# Patient Record
Sex: MALE | Race: WHITE | NOT HISPANIC OR LATINO | Employment: FULL TIME | URBAN - METROPOLITAN AREA
[De-identification: names, ages, dates, MRNs, and addresses within clinical notes are randomized per-mention and may not be internally consistent; named-entity substitution may affect disease eponyms.]

---

## 2023-03-01 LAB — SARS-COV-2 RESULT: NOT DETECTED

## 2023-05-25 DIAGNOSIS — I10 ESSENTIAL HYPERTENSION: ICD-10-CM

## 2023-05-25 DIAGNOSIS — K21.9 GASTROESOPHAGEAL REFLUX DISEASE WITHOUT ESOPHAGITIS: ICD-10-CM

## 2023-05-25 RX ORDER — PANTOPRAZOLE SODIUM 40 MG/1
40 TABLET, DELAYED RELEASE ORAL DAILY
Qty: 90 TABLET | Refills: 0 | Status: SHIPPED | OUTPATIENT
Start: 2023-05-25 | End: 2023-06-28 | Stop reason: SDUPTHER

## 2023-05-25 RX ORDER — PANTOPRAZOLE SODIUM 40 MG/1
1 TABLET, DELAYED RELEASE ORAL DAILY
COMMUNITY
Start: 2022-09-02 | End: 2023-05-25 | Stop reason: SDUPTHER

## 2023-05-25 RX ORDER — AMLODIPINE AND BENAZEPRIL HYDROCHLORIDE 10; 40 MG/1; MG/1
1 CAPSULE ORAL DAILY
COMMUNITY
Start: 2022-04-06 | End: 2023-05-25 | Stop reason: SDUPTHER

## 2023-05-25 RX ORDER — AMLODIPINE AND BENAZEPRIL HYDROCHLORIDE 10; 40 MG/1; MG/1
1 CAPSULE ORAL DAILY
Qty: 90 CAPSULE | Refills: 0 | Status: SHIPPED | OUTPATIENT
Start: 2023-05-25 | End: 2023-06-01 | Stop reason: SINTOL

## 2023-06-01 ENCOUNTER — OFFICE VISIT (OUTPATIENT)
Dept: PRIMARY CARE | Facility: CLINIC | Age: 43
End: 2023-06-01
Payer: COMMERCIAL

## 2023-06-01 VITALS
DIASTOLIC BLOOD PRESSURE: 77 MMHG | BODY MASS INDEX: 26.71 KG/M2 | WEIGHT: 208 LBS | HEART RATE: 79 BPM | SYSTOLIC BLOOD PRESSURE: 128 MMHG

## 2023-06-01 DIAGNOSIS — I10 ESSENTIAL HYPERTENSION: Primary | ICD-10-CM

## 2023-06-01 PROBLEM — F19.90 PSYCHOACTIVE SUBSTANCE USE DISORDER: Status: ACTIVE | Noted: 2023-06-01

## 2023-06-01 PROBLEM — N52.9 ERECTILE DYSFUNCTION: Status: ACTIVE | Noted: 2023-06-01

## 2023-06-01 PROBLEM — G25.2 INTENTION TREMOR: Status: ACTIVE | Noted: 2023-06-01

## 2023-06-01 PROBLEM — J30.2 SEASONAL ALLERGIES: Status: ACTIVE | Noted: 2023-06-01

## 2023-06-01 PROBLEM — M51.36 BULGING LUMBAR DISC: Status: ACTIVE | Noted: 2023-06-01

## 2023-06-01 PROBLEM — M51.369 BULGING LUMBAR DISC: Status: ACTIVE | Noted: 2023-06-01

## 2023-06-01 PROBLEM — F41.9 ANXIETY: Status: ACTIVE | Noted: 2023-06-01

## 2023-06-01 PROBLEM — R74.01 ELEVATED ALANINE AMINOTRANSFERASE (ALT) LEVEL: Status: ACTIVE | Noted: 2023-06-01

## 2023-06-01 PROBLEM — F32.A DEPRESSION: Status: ACTIVE | Noted: 2023-06-01

## 2023-06-01 PROBLEM — N28.1 RENAL CYST, RIGHT: Status: ACTIVE | Noted: 2023-06-01

## 2023-06-01 PROBLEM — J45.901 ASTHMA EXACERBATION, MILD (HHS-HCC): Status: ACTIVE | Noted: 2023-06-01

## 2023-06-01 PROBLEM — G47.09 OTHER INSOMNIA: Status: ACTIVE | Noted: 2023-06-01

## 2023-06-01 PROCEDURE — 3074F SYST BP LT 130 MM HG: CPT | Performed by: NURSE PRACTITIONER

## 2023-06-01 PROCEDURE — 3078F DIAST BP <80 MM HG: CPT | Performed by: NURSE PRACTITIONER

## 2023-06-01 PROCEDURE — 99213 OFFICE O/P EST LOW 20 MIN: CPT | Performed by: NURSE PRACTITIONER

## 2023-06-01 PROCEDURE — 1036F TOBACCO NON-USER: CPT | Performed by: NURSE PRACTITIONER

## 2023-06-01 RX ORDER — ALBUTEROL SULFATE 90 UG/1
2 AEROSOL, METERED RESPIRATORY (INHALATION) EVERY 6 HOURS PRN
COMMUNITY
Start: 2018-12-26 | End: 2023-06-28 | Stop reason: SDUPTHER

## 2023-06-01 RX ORDER — PROPRANOLOL HYDROCHLORIDE 20 MG/1
20 TABLET ORAL 3 TIMES DAILY PRN
COMMUNITY
End: 2023-06-28 | Stop reason: SDUPTHER

## 2023-06-01 RX ORDER — BUPROPION HYDROCHLORIDE 300 MG/1
300 TABLET ORAL
COMMUNITY
End: 2024-02-15 | Stop reason: WASHOUT

## 2023-06-01 RX ORDER — CHLORTHALIDONE 25 MG/1
12.5 TABLET ORAL DAILY
Qty: 15 TABLET | Refills: 0 | Status: SHIPPED | OUTPATIENT
Start: 2023-06-01 | End: 2023-06-28 | Stop reason: SINTOL

## 2023-06-01 RX ORDER — BENAZEPRIL HYDROCHLORIDE 40 MG/1
40 TABLET ORAL DAILY
Qty: 30 TABLET | Refills: 11 | Status: SHIPPED | OUTPATIENT
Start: 2023-06-01 | End: 2023-06-28 | Stop reason: SINTOL

## 2023-06-01 RX ORDER — TADALAFIL 20 MG/1
TABLET ORAL
COMMUNITY
Start: 2022-04-06 | End: 2023-06-20 | Stop reason: SDUPTHER

## 2023-06-01 RX ORDER — CHLORTHALIDONE 25 MG/1
25 TABLET ORAL DAILY
Qty: 30 TABLET | Refills: 0 | Status: SHIPPED | OUTPATIENT
Start: 2023-06-01 | End: 2023-06-01 | Stop reason: DRUGHIGH

## 2023-06-01 RX ORDER — ESCITALOPRAM OXALATE 5 MG/1
TABLET ORAL
COMMUNITY
End: 2024-02-15 | Stop reason: WASHOUT

## 2023-06-01 NOTE — PATIENT INSTRUCTIONS
Thank you for seeing me today.  It was a pleasure to see you again!    #HTN  Your blood pressure should be </= 130/80.  Today your blood pressure was 128/77  STOP Benazepril-Amlodipine   BEGIN BENAZEPRIL 40 MG AND CHLORTHALIDONE 12.5 MG DAILY    You should avoid foods that are high in sodium and saturated fats  Exercise daily for at least 30 minutes  minutes/week  Avoid stressful situations as this can increase your blood pressure  Take your medications as prescribed--do not skip doses  Obtain a BP monitor and check your blood pressure at home. Check it around the same time each day, at least 1 hour after taking your medication.  Record your blood pressure in a log and  bring your log with you to your next appointment.    RTC 3 WEEKS FOR BP CHECK

## 2023-06-01 NOTE — PROGRESS NOTES
"Subjective   Chief Complaint   Patient presents with    Follow-up     YANIRA IS HERE TODAY FOR ROUTINE F/U AND MEDICATION REFILLS.        Patient ID: Yanira Hernandez is a 42 y.o. male who presents for Follow-up (YANIRA IS HERE TODAY FOR ROUTINE F/U AND MEDICATION REFILLS. ).    HPI  Est 41 yo male presents today for 6 mo f/u on HTN    #HTN  Rx Amlodipine-Benazepril 10-40 mg daily  BP rhicr=023/77  Pt c/o \"excessive sweating\" and feels it may be r/t the BP med, would like to try changing it     Pt seeing Bria Vasquez for anxiety/depression  LOV Jan 2023    Review of Systems  All 13 systems were reviewed and are within normal limits except positive and pertinent negative responses which are noted below or in HPI.     Objective   /77   Pulse 79   Wt 94.3 kg (208 lb)   BMI 26.71 kg/m²        Physical Exam  Vitals reviewed.   Cardiovascular:      Pulses: Normal pulses.      Heart sounds: No murmur heard.  Pulmonary:      Effort: Pulmonary effort is normal.   Neurological:      Mental Status: He is alert.         Assessment/Plan   Problem List Items Addressed This Visit          Circulatory    Essential hypertension - Primary    Relevant Medications    benazepril (Lotensin) 40 mg tablet    chlorthalidone (Hygroton) 25 mg tablet       "

## 2023-06-20 ENCOUNTER — APPOINTMENT (OUTPATIENT)
Dept: PRIMARY CARE | Facility: CLINIC | Age: 43
End: 2023-06-20
Payer: COMMERCIAL

## 2023-06-20 DIAGNOSIS — N52.39 POST-PROCEDURAL ERECTILE DYSFUNCTION, UNSPECIFIED TYPE: Primary | ICD-10-CM

## 2023-06-20 RX ORDER — TADALAFIL 20 MG/1
20 TABLET ORAL DAILY PRN
Qty: 10 TABLET | Refills: 0 | Status: SHIPPED | OUTPATIENT
Start: 2023-06-20 | End: 2023-06-28 | Stop reason: SDUPTHER

## 2023-06-27 NOTE — PROGRESS NOTES
"Subjective   Chief Complaint   Patient presents with    Follow-up     Jose Alejandro is here today for 3-4 week F/U.        Patient ID: Jose Alejandro Hernandez is a 43 y.o. male who presents for Follow-up (Jose Alejandro is here today for 3-4 week F/U. ).    HPI  Est 44 yo male presents for 1 mo f/u on medication change    Pt requested to change his BP med from Benazepril-Amlodipine to Benazepril 40 mg and Chlorthalidone 12.5 mg daily    Pt reports today that he had began feeling dizzy/lightheaded and fatigued a few days after beginning medication and then passed out x 1 occurrence and states his wife took his BP and it was \"70/50\"  Pt did not sustain any injuries or seek medical care (or call my office)  Pt states he stopped taking all BP meds x 2.5 weeks ago and has been fine    BP today= 113/73       Review of Systems  All 13 systems were reviewed and are within normal limits except positive and pertinent negative responses which are noted below or in HPI.    Objective   /73   Pulse 73   Ht 1.88 m (6' 2\")   Wt 94.3 kg (208 lb)   BMI 26.71 kg/m²        Physical Exam  Cardiovascular:      Pulses: Normal pulses.      Heart sounds: No murmur heard.  Pulmonary:      Effort: Pulmonary effort is normal.   Neurological:      Mental Status: He is alert.         Assessment/Plan   Problem List Items Addressed This Visit       Essential hypertension    GERD (gastroesophageal reflux disease)    Relevant Medications    pantoprazole (ProtoNix) 40 mg EC tablet    Asthma exacerbation, mild    Relevant Medications    albuterol 90 mcg/actuation inhaler    Anxiety - Primary    Relevant Medications    propranolol (Inderal) 20 mg tablet    Erectile dysfunction    Relevant Medications    tadalafil (Cialis) 20 mg tablet    Cannabis use disorder, severe, in early remission (CMS/[facility])       "

## 2023-06-28 ENCOUNTER — OFFICE VISIT (OUTPATIENT)
Dept: PRIMARY CARE | Facility: CLINIC | Age: 43
End: 2023-06-28
Payer: COMMERCIAL

## 2023-06-28 VITALS
DIASTOLIC BLOOD PRESSURE: 73 MMHG | SYSTOLIC BLOOD PRESSURE: 113 MMHG | HEART RATE: 73 BPM | WEIGHT: 208 LBS | HEIGHT: 74 IN | BODY MASS INDEX: 26.69 KG/M2

## 2023-06-28 DIAGNOSIS — K21.9 GASTROESOPHAGEAL REFLUX DISEASE WITHOUT ESOPHAGITIS: ICD-10-CM

## 2023-06-28 DIAGNOSIS — N52.39 POST-PROCEDURAL ERECTILE DYSFUNCTION, UNSPECIFIED TYPE: ICD-10-CM

## 2023-06-28 DIAGNOSIS — F12.21: ICD-10-CM

## 2023-06-28 DIAGNOSIS — J45.901 ASTHMA EXACERBATION, MILD (HHS-HCC): ICD-10-CM

## 2023-06-28 DIAGNOSIS — I10 ESSENTIAL HYPERTENSION: ICD-10-CM

## 2023-06-28 DIAGNOSIS — N52.2 DRUG-INDUCED ERECTILE DYSFUNCTION: ICD-10-CM

## 2023-06-28 DIAGNOSIS — F41.9 ANXIETY: Primary | ICD-10-CM

## 2023-06-28 PROCEDURE — 1036F TOBACCO NON-USER: CPT | Performed by: NURSE PRACTITIONER

## 2023-06-28 PROCEDURE — 3074F SYST BP LT 130 MM HG: CPT | Performed by: NURSE PRACTITIONER

## 2023-06-28 PROCEDURE — 99213 OFFICE O/P EST LOW 20 MIN: CPT | Performed by: NURSE PRACTITIONER

## 2023-06-28 PROCEDURE — 3078F DIAST BP <80 MM HG: CPT | Performed by: NURSE PRACTITIONER

## 2023-06-28 RX ORDER — PANTOPRAZOLE SODIUM 40 MG/1
40 TABLET, DELAYED RELEASE ORAL DAILY
Qty: 90 TABLET | Refills: 0 | Status: SHIPPED | OUTPATIENT
Start: 2023-06-28 | End: 2023-10-03 | Stop reason: SDUPTHER

## 2023-06-28 RX ORDER — PROPRANOLOL HYDROCHLORIDE 20 MG/1
20 TABLET ORAL 3 TIMES DAILY PRN
Qty: 30 TABLET | Refills: 0 | Status: SHIPPED | OUTPATIENT
Start: 2023-06-28 | End: 2024-02-15 | Stop reason: WASHOUT

## 2023-06-28 RX ORDER — SERTRALINE HYDROCHLORIDE 100 MG/1
100 TABLET, FILM COATED ORAL DAILY
COMMUNITY
Start: 2023-06-04 | End: 2024-02-15 | Stop reason: WASHOUT

## 2023-06-28 RX ORDER — ALBUTEROL SULFATE 90 UG/1
2 AEROSOL, METERED RESPIRATORY (INHALATION) EVERY 6 HOURS PRN
Qty: 18 G | Refills: 3 | Status: SHIPPED | OUTPATIENT
Start: 2023-06-28

## 2023-06-28 RX ORDER — TADALAFIL 20 MG/1
20 TABLET ORAL DAILY PRN
Qty: 30 TABLET | Refills: 1 | Status: SHIPPED | OUTPATIENT
Start: 2023-06-28 | End: 2023-08-27

## 2023-06-28 NOTE — PATIENT INSTRUCTIONS
Thank you for seeing me today.  It was a pleasure to see you again!    Your blood pressure should be </= 130/80.  Today your blood pressure was 113/73  REMAIN OFF OF BP MEDICATIONS     You should avoid foods that are high in sodium and saturated fats  Exercise daily for at least 30 minutes  minutes/week  Avoid stressful situations as this can increase your blood pressure    Obtain a BP monitor and check your blood pressure at home. Check it around the same time each day, at least 1 hour after taking your medication.  Record your blood pressure in a log and  bring your log with you to your next appointment.    RTC AS NEEDED

## 2023-10-03 DIAGNOSIS — K21.9 GASTROESOPHAGEAL REFLUX DISEASE WITHOUT ESOPHAGITIS: ICD-10-CM

## 2023-10-03 RX ORDER — PANTOPRAZOLE SODIUM 40 MG/1
40 TABLET, DELAYED RELEASE ORAL DAILY
Qty: 90 TABLET | Refills: 3 | Status: SHIPPED | OUTPATIENT
Start: 2023-10-03 | End: 2024-09-27

## 2024-02-15 ENCOUNTER — TELEMEDICINE (OUTPATIENT)
Dept: BEHAVIORAL HEALTH | Facility: CLINIC | Age: 44
End: 2024-02-15
Payer: COMMERCIAL

## 2024-02-15 DIAGNOSIS — Z79.899 MEDICATION MANAGEMENT: ICD-10-CM

## 2024-02-15 DIAGNOSIS — J45.901 ASTHMA EXACERBATION, MILD (HHS-HCC): ICD-10-CM

## 2024-02-15 DIAGNOSIS — F33.1 MODERATE EPISODE OF RECURRENT MAJOR DEPRESSIVE DISORDER (MULTI): ICD-10-CM

## 2024-02-15 DIAGNOSIS — F41.1 GAD (GENERALIZED ANXIETY DISORDER): Primary | ICD-10-CM

## 2024-02-15 DIAGNOSIS — G47.00 INSOMNIA, UNSPECIFIED TYPE: ICD-10-CM

## 2024-02-15 DIAGNOSIS — F12.21: ICD-10-CM

## 2024-02-15 DIAGNOSIS — F16.91 HALLUCINOGEN USE DISORDER IN REMISSION: ICD-10-CM

## 2024-02-15 PROCEDURE — 1036F TOBACCO NON-USER: CPT | Performed by: NURSE PRACTITIONER

## 2024-02-15 PROCEDURE — 99214 OFFICE O/P EST MOD 30 MIN: CPT | Performed by: NURSE PRACTITIONER

## 2024-02-15 RX ORDER — BUPROPION HYDROCHLORIDE 300 MG/1
300 TABLET ORAL EVERY MORNING
Qty: 90 TABLET | Refills: 3 | Status: SHIPPED | OUTPATIENT
Start: 2024-02-15 | End: 2025-02-14

## 2024-02-15 RX ORDER — SERTRALINE HYDROCHLORIDE 100 MG/1
100 TABLET, FILM COATED ORAL DAILY
Qty: 90 TABLET | Refills: 3 | Status: SHIPPED | OUTPATIENT
Start: 2024-02-15 | End: 2025-02-14

## 2024-02-15 RX ORDER — PROPRANOLOL HYDROCHLORIDE 10 MG/1
20 TABLET ORAL 3 TIMES DAILY PRN
Qty: 90 TABLET | Refills: 11 | Status: SHIPPED | OUTPATIENT
Start: 2024-02-15 | End: 2025-02-14

## 2024-02-15 ASSESSMENT — ENCOUNTER SYMPTOMS: CONSTITUTIONAL NEGATIVE: 1

## 2024-02-15 NOTE — PROGRESS NOTES
"HPI  Jose Alejandro Hernandez is a 43 y.o. male patient with a chief complaint of   Chief Complaint   Patient presents with    Anxiety    Depression    Sleeping Problem    Addiction Problem     Cannabis; Hallucinogens    Med Management    presenting to outpatient treatment for a scheduled psych outpatient psychiatric follow-up.    NOTE: Symptom scale is rated where 0 = no symptoms at all, and 10 = symptoms so severe that pt is an imminent danger to themselves or others and requires hospitalization.    Anxiety, Depression, Cravings, and Sleep disturbances remain(s) present more days than not, which has worsened  over the past few weeks. Jose Alejandro Hernandez rates the severity of psych symptoms as a 6/10 (last rated 2/10), noting symptom improvement with support from partner, sobriety, and worsening of symptoms with work-related stress and concern for dtr's health.     -Mood: \"pretty good. I'm just out of the Wellbutrin.\" Mood-wise felt fine until ran out of bupropion ~1 month ago. Got promoted at work \"so there's a lot of stress.\" No longer working with talk therapist \"she left practice.\" Is still seeing a therapist through [facility] (Germaine). Coming up on 3 yrs sober - has a sponsor, goes to weekly AA meetings.   -Sleep/Energy/Motivation: \"it's about still where it's at. I've been waking up at 5am regularly, but that's just to have my own time before everyone wakes up. Going to sleep ~10pm\" without issues falling asleep.   -Appetite/Weight Changes: still exercising, noting slight weight gain since off the bupropion \"eating more - other than that, it's fine.\"  -Psychosis: denies issues/changes since last visit.   -SI/HI: denies issues/changes since last visit. Denies prior SA hx.       HISTORY  -Psych Hx: sees Germaine for talk therapy through [facility]. Last saw Carmen Saba - virtual visits through [facility] - starting in Apr 2021). Received talk therapy while in substance tx from Feb-Apr 2021. Previously went to " "[facility] ([place], [place]) ~2018, \"it wasn't effective - I needed to talk - it was not quality.\" Went for a few sessions.  -Psych Med Hx: hydroxyzine 25mg (ineffective); citalopram (doesn't recall), clonazepam (\"not a good thing because the changes of abuse - I never did abuse it but I could see how it could get out of control\"), lorazepam (doesn't recall), zolpidem (doesn't recall); mirtazapine (weight gain)  -Substance Use Hx: hx of cannabis and psychedelic (mushrooms, LSD, DMT) abuse - noted an issue \"for a while, once COVID hit the stress got really bad.\" Denies illicit substance use.  -ETOH: denies.  -Tobacco: denies.  -Caffeine: \"I drink a lot of coffee - all day from the time I woke up until I went to bad.\" Reports for past 60 days has decreased to 2-3 cups of coffee/day.  -Substance Abuse Treatment Hx: Attends NA meetings regularly. Was in a treatment facility ([facility] in [place], [place]) for substance (marijuana/psychedelics) use issues (discharged 4/16/2021 after a 50+ day stay).  -Supports: wife is supportive. \"I have a good support group - some people I met in the house.\" Attends NA meetings regularly. Has a therapist (Carmen Saba - virtual visits through [facility] - started Apr 2021).  -Housing: lives w/wife and 4 children (ages 6, 8, 11, and 14) in a home \"lot of activities/stress\" - youngest dtr has chronic health issues that is stressful - feels safe.  -Income: new job at [employer]   -Education: Masters.  -TBI/head trauma/LOC/seizure hx: +LOC (syncope) - saw a neurologist, no abnormal findings in ~2013.  -The past, family, and social history has been reviewed and there are no findings pertinent to the chief complaint.       REVIEW OF SYSTEMS  Review of Systems   Constitutional: Negative.    All other systems reviewed and are negative.    Objective   Physical Exam  Psychiatric:         Attention and Perception: Attention and perception normal.         " Mood and Affect: Mood and affect normal.         Speech: Speech normal.         Behavior: Behavior normal. Behavior is cooperative.         Thought Content: Thought content normal.         Cognition and Memory: Cognition and memory normal.         Judgment: Judgment normal.         MEDICAL-DECISION MAKING  Mood-wise reporting slightly worse in context of running out of bupropion - worked out ways to address logistical issues to avoid lapsing on meds. Aside from this disruption, pt reports doing very well on current psych med regimen and looking forward to restarting bupropion. Coming up on 3 yrs of sobriety, got promoted at work, and continues to practice good self-care. Having trouble getting consistent talk therapy - did offer a referral for an in-house therapist, but pt does have a long-term therapist out of [facility] that he prefers over trying to start up with a new one. Given overall pt stability, OK to keep psych med regimen and visit frequency as is.     Psych med regimen as follows:   1. Bupropion XL 300mg QAM   2. Sertraline 100mg/day   3. Propranolol 20mg TID-PRN    IMPRESSION  -SEYMOUR  -MDD, recurrent, moderate - active  -Insomnia disorder, unspecified  -Cannabis use disorder, severe - in remission  -Hallucinogen use disorder - in remission       PLAN  -Continue Medications as directed.  -Consider talk therapy with Germaine as able.  -Follow-up with this provider in 6 months.  -Risks/benefits/assessment of medication interventions discussed with pt; pt agreeable to plan. Will continue to monitor for symptoms mgmt and SEs and adjust plan as needed.  -MI to increase coping skills/behavior regulation.  -Safety plan reviewed.  -Call  Psychiatry at [phone] with issues.  -For [facility] residents, [place] is a 24/7 hotline you can call for assistance at [phone] [phone]. Please call 911 or go to your closest Emergency Room if you feel worse. This includes thoughts of hurting yourself or anyone else, or  having other troubles such as hearing voices, seeing visions, or having new and scary thoughts about the people around you.

## 2024-07-17 DIAGNOSIS — N52.2 DRUG-INDUCED ERECTILE DYSFUNCTION: ICD-10-CM

## 2024-07-17 DIAGNOSIS — N52.39 POST-PROCEDURAL ERECTILE DYSFUNCTION, UNSPECIFIED TYPE: ICD-10-CM

## 2024-07-17 RX ORDER — TADALAFIL 20 MG/1
20 TABLET ORAL DAILY PRN
Qty: 30 TABLET | Refills: 0 | Status: SHIPPED | OUTPATIENT
Start: 2024-07-17

## 2024-08-15 ENCOUNTER — APPOINTMENT (OUTPATIENT)
Dept: BEHAVIORAL HEALTH | Facility: CLINIC | Age: 44
End: 2024-08-15
Payer: COMMERCIAL

## 2024-10-25 DIAGNOSIS — K21.9 GASTROESOPHAGEAL REFLUX DISEASE WITHOUT ESOPHAGITIS: ICD-10-CM

## 2024-10-25 RX ORDER — PANTOPRAZOLE SODIUM 40 MG/1
40 TABLET, DELAYED RELEASE ORAL
Qty: 30 TABLET | Refills: 0 | Status: SHIPPED | OUTPATIENT
Start: 2024-10-25

## 2024-11-05 ENCOUNTER — TELEPHONE (OUTPATIENT)
Dept: BEHAVIORAL HEALTH | Facility: CLINIC | Age: 44
End: 2024-11-05
Payer: COMMERCIAL

## 2024-11-07 DIAGNOSIS — N52.2 DRUG-INDUCED ERECTILE DYSFUNCTION: ICD-10-CM

## 2024-11-07 DIAGNOSIS — K21.9 GASTROESOPHAGEAL REFLUX DISEASE WITHOUT ESOPHAGITIS: ICD-10-CM

## 2024-11-07 DIAGNOSIS — N52.39 POST-PROCEDURAL ERECTILE DYSFUNCTION, UNSPECIFIED TYPE: ICD-10-CM

## 2024-11-07 DIAGNOSIS — J45.901 ASTHMA EXACERBATION, MILD (HHS-HCC): ICD-10-CM

## 2024-11-07 RX ORDER — TADALAFIL 20 MG/1
20 TABLET ORAL DAILY PRN
Qty: 30 TABLET | Refills: 0 | Status: SHIPPED | OUTPATIENT
Start: 2024-11-07

## 2024-11-07 RX ORDER — PANTOPRAZOLE SODIUM 40 MG/1
40 TABLET, DELAYED RELEASE ORAL
Qty: 90 TABLET | Refills: 3 | Status: SHIPPED | OUTPATIENT
Start: 2024-11-07

## 2024-11-07 RX ORDER — ALBUTEROL SULFATE 90 UG/1
2 INHALANT RESPIRATORY (INHALATION) EVERY 6 HOURS PRN
Qty: 18 G | Refills: 3 | Status: SHIPPED | OUTPATIENT
Start: 2024-11-07

## 2024-11-19 ENCOUNTER — APPOINTMENT (OUTPATIENT)
Dept: BEHAVIORAL HEALTH | Facility: CLINIC | Age: 44
End: 2024-11-19
Payer: COMMERCIAL

## 2024-11-19 DIAGNOSIS — F12.21: ICD-10-CM

## 2024-11-19 DIAGNOSIS — F41.1 GAD (GENERALIZED ANXIETY DISORDER): ICD-10-CM

## 2024-11-19 DIAGNOSIS — F33.1 MODERATE EPISODE OF RECURRENT MAJOR DEPRESSIVE DISORDER: ICD-10-CM

## 2024-11-19 DIAGNOSIS — G47.00 INSOMNIA, UNSPECIFIED TYPE: ICD-10-CM

## 2024-11-19 DIAGNOSIS — F16.91 HALLUCINOGEN USE DISORDER IN REMISSION: ICD-10-CM

## 2024-11-19 PROCEDURE — 99215 OFFICE O/P EST HI 40 MIN: CPT

## 2024-11-19 PROCEDURE — G2211 COMPLEX E/M VISIT ADD ON: HCPCS

## 2024-11-19 RX ORDER — BUSPIRONE HYDROCHLORIDE 10 MG/1
10 TABLET ORAL 2 TIMES DAILY PRN
Qty: 360 TABLET | Refills: 0 | Status: SHIPPED | OUTPATIENT
Start: 2024-11-19 | End: 2025-11-19

## 2024-11-19 RX ORDER — SERTRALINE HYDROCHLORIDE 100 MG/1
150 TABLET, FILM COATED ORAL DAILY
Qty: 135 TABLET | Refills: 1 | Status: SHIPPED | OUTPATIENT
Start: 2024-11-19 | End: 2025-05-18

## 2024-11-19 ASSESSMENT — ANXIETY QUESTIONNAIRES
4. TROUBLE RELAXING: MORE THAN HALF THE DAYS
3. WORRYING TOO MUCH ABOUT DIFFERENT THINGS: NEARLY EVERY DAY
IF YOU CHECKED OFF ANY PROBLEMS ON THIS QUESTIONNAIRE, HOW DIFFICULT HAVE THESE PROBLEMS MADE IT FOR YOU TO DO YOUR WORK, TAKE CARE OF THINGS AT HOME, OR GET ALONG WITH OTHER PEOPLE: EXTREMELY DIFFICULT
7. FEELING AFRAID AS IF SOMETHING AWFUL MIGHT HAPPEN: NEARLY EVERY DAY
1. FEELING NERVOUS, ANXIOUS, OR ON EDGE: NEARLY EVERY DAY
2. NOT BEING ABLE TO STOP OR CONTROL WORRYING: NEARLY EVERY DAY
GAD7 TOTAL SCORE: 17
6. BECOMING EASILY ANNOYED OR IRRITABLE: NOT AT ALL
5. BEING SO RESTLESS THAT IT IS HARD TO SIT STILL: NEARLY EVERY DAY

## 2024-11-19 ASSESSMENT — PATIENT HEALTH QUESTIONNAIRE - PHQ9
9. THOUGHTS THAT YOU WOULD BE BETTER OFF DEAD, OR OF HURTING YOURSELF: NOT AT ALL
1. LITTLE INTEREST OR PLEASURE IN DOING THINGS: NEARLY EVERY DAY
8. MOVING OR SPEAKING SO SLOWLY THAT OTHER PEOPLE COULD HAVE NOTICED. OR THE OPPOSITE, BEING SO FIGETY OR RESTLESS THAT YOU HAVE BEEN MOVING AROUND A LOT MORE THAN USUAL: SEVERAL DAYS
SUM OF ALL RESPONSES TO PHQ QUESTIONS 1-9: 16
7. TROUBLE CONCENTRATING ON THINGS, SUCH AS READING THE NEWSPAPER OR WATCHING TELEVISION: SEVERAL DAYS
3. TROUBLE FALLING OR STAYING ASLEEP: NEARLY EVERY DAY
10. IF YOU CHECKED OFF ANY PROBLEMS, HOW DIFFICULT HAVE THESE PROBLEMS MADE IT FOR YOU TO DO YOUR WORK, TAKE CARE OF THINGS AT HOME, OR GET ALONG WITH OTHER PEOPLE: VERY DIFFICULT
4. FEELING TIRED OR HAVING LITTLE ENERGY: MORE THAN HALF THE DAYS
2. FEELING DOWN, DEPRESSED OR HOPELESS: NEARLY EVERY DAY
6. FEELING BAD ABOUT YOURSELF - OR THAT YOU ARE A FAILURE OR HAVE LET YOURSELF OR YOUR FAMILY DOWN: MORE THAN HALF THE DAYS
SUM OF ALL RESPONSES TO PHQ9 QUESTIONS 1 & 2: 6
5. POOR APPETITE OR OVEREATING: SEVERAL DAYS

## 2024-11-19 NOTE — PROGRESS NOTES
An interactive audio and video telecommunication system which permits real time communications between the patient (at the originating site) and provider (at the distant site) was utilized to provide this telehealth service.   Verbal consent was requested and obtained from Jose Alejandro Hernandez on this date, 24 for a telehealth visit. . Visit Locations: (Jose Alejandro Hernandez, work), provider (Marques Jewell, office)    HPI  Jose Alejandro Hernandez is a 44 y.o. male patient with a CC Anxiety, Depression, Panic Attack, Sleeping Problem, and Addiction Problem (Cannabis/hallucinogens, in remission) presenting to outpatient treatment for a scheduled psych outpatient psychiatric follow-up.   Pt identify self by name, , and address     Referred previously followed by Perico Echols, previous provider who has now left the practice, for ongoing management of SEYMOUR, MDD, sleep problems, and hx of TD (cannabis, hallucinogens) in remission since 4 yrs ago.    States he just found out that he's getting divorce since a month ago. Will not be able to see his kids much because of excessive working hours, went from an 8 hrs work day to 12 hours, now working about 14 hours/day. States he's trying to stay clean and prevent self from using again. Trying to work less to take better care of himself. Seeking FMLA after completing an upcoming event, likely divorce hearing which is causing current destabilization with anxiety/depression. States because of exhaustive working hours, has not been able to see sponsor as often as he's suppose too and fearful he might relapse. Would like FMLA to be ideally between mid December to mid January.     Currently prescribed Wellbutrin  mg daily and Zoloft 100 mg daily    Current S/Sx:  -Mood: stable  -Depressive mood: moderate. PHQ-9 (16)  -Fatigue/Energy: average  -Feeling hope/help/worthless: denies  -Sleep: sleeps about 4 hours/night with severe anxiety, waking up a few times throughout the night.    -Motivation: low  -Appetite/Weight Changes: fluctuating appetite, gained about 15 lbs in the last few months  -Psychosis: denies hallucinations/delusions  -SI/HI: Denies current suicidal intent/plan. CSSRS negative  -Guns/Weapons at home: denies     -Worry excessively: severe anxiety noted, impacting ability to complete ADLs, function at work, process day to day stressor, handle up coming divorce, affecting sleep.  -Difficulty controlling worry: yes  -Easily fatigued d/t worry: yes  -Poor concentration d/t worry: yes  -Sleep disturbance d/t worry: yes  -Easy irritability d/t worry: denies  -Restless / feeling on edge d/t worry: yes  -SEYMOUR-7 (17)     Panic attacks  Onset: 2020, duration: sometimes an hour, sometimes longer, frequency: very frequent, associated s/sx: SOB, confusion, racing mind, racing thoughts, fear of impending doom, feels trapped, relieving factors: time/sometimes Propranolol, precipitating factors: current stressors, last one: yesterday     HISTORY  PSYCH HISTORY  -Psych Hx: SEYMOUR, MDD, sleep problems, and hx of TD (cannabis, hallucinogens) in remission since 4 yrs ago  -Psych Hospitalization Hx: denies  -Suicide Attempt Hx: denies  -Self-Harm/Violence Hx: denies  -Current psych meds: Wellbutrin  mg daily and Zoloft 100 mg daily, Propranolol 10 mg daily as needed for severe anxiety/panic attacks (mostly takes 5 mg d/t sedating effects)  -Psych Med Hx: none     SUBSTANCE USE HISTORY  -Substance Use Hx: cannabis (25 yrs), hallucinogens (5 yrs)  -ETOH: denies  -Tobacco: denies  -Caffeine: some coffee use  -Substance Abuse Treatment Hx: Treatment program to help quit. Armin Casper, 2020, has a sponsor, NA meetings 2-3 times/wk, not in the last 3 months d/t work wks.     FAMILY HISTORY  -Family Psych Hx: maternal GM: schizophrenia  -Family Suicide Hx: denies  -Family Substance Abuse Hx: both GF: alcoholics     SOCIAL HISTORY  -Upbringing: Grew up with both parents. Has 1 sister  -income:  prominent financial struggles  -safety: feels safe at home/generally  -Support system: good  -Trauma: denies  -Education: masters  -Work: education  -Marital Status: undergoing divorce proceedings since 1 month ago  -Children: 4  -Living situation: living alone  -: denies  -Legal: denies      MEDICAL HISTORY  -PCP: GLYNN Jenkins-CNP  -TBI/head trauma/LOC/seizure hx: denies     REVIEW OF SYSTEMS  Review of Systems   All other systems reviewed and are negative.       PHYSICAL EXAM  Physical Exam  Psychiatric:         Attention and Perception: Attention and perception normal.         Mood and Affect: Mood is anxious and depressed. Affect is flat.         Speech: Speech normal.         Behavior: Behavior normal. Behavior is cooperative.         Thought Content: Thought content normal.         Cognition and Memory: Cognition and memory normal.         Judgment: Judgment normal.        IMPRESSION  Jose Alejandro Hernandez is a 44 y.o. male patient with a CC Anxiety, Depression, Panic Attack, Sleeping Problem, and Addiction Problem (Cannabis/hallucinogens, in remission) presenting to outpatient treatment for a scheduled psych outpatient psychiatric follow-up.    Jose Alejandro continue to meet criteria for SEYMOUR with panic attacks, MDD, and history of polysubstance abuse, currently in remission x4 years.  Anxiety has been severe in the last several weeks as he undergoes divorce proceedings with wife in the last month, went to working from 8 to 14 hours, and unable to spend time with his kids, as a result, feels highly stressed out, noting nearly daily anxiety with panic attacks as well as bouts of depressive episodes as noted in the subjective and objective assessment.  Patient is fearful to relapse again due to current stressors.  Otherwise, denies mood swings.  No hallucinations/delusion/nazia/hypomania/SI reported. Appetite and sleep are unstable.  No side effects or substance use concerns at this time.  Would like to  resume NA, ongoing meetings with sponsor, and counseling.  Last attended over 3 months ago due to work schedule.  Requesting FMLA from first week of December to mid January to follow-up divorce proceedings and resume counseling and before to maintain sobriety.  Discussed to increase Zoloft, continue Wellbutrin, and add buspirone as needed for moments of anxiety.  Cannot provide benzos given substance use history.  Follow-up in 4 weeks.     SI/HI ASSESSMENT  -Risk Assessment: Jose Alejandro Hernandez is currently a medium chronic risk of suicide and self-harm despite no past suicide attempt(s) and not currently endorsing thoughts of suicide.   -Suicidal Risk Factors: , male, and substance abuse  -Protective Factors: strong coping skills, sense of responsibility towards family, positive family relationships, hopefulness/future orientation, marriage/partnership, and employment  -Plan to Reduce Risk: increase coping skills .     PLAN  Reviewed diagnostic impression including subjective and objective data and provided education about Panic attacks, MDD, SEYMOUR, and Sleep disturbance, etiology, treatment recommendations including medication, therapy, course of treatment and prognosis. Patient amenable to treatment plan.      Dx: Anxiety, Depression, Panic Attack, Sleeping Problem, and Addiction Problem (Cannabis/hallucinogens, in remission)  START buspirone 10 mg 2 times daily as needed for severe anxiety/panic attacks  CONTINUE Wellbutrin  mg daily  INCREASE Zoloft 150 mg daily    TO DO  - FMLA - completed     Reviewed r/b/a, possible side effects of the medication. Client is aware about the benefit outweighs the risk.     Psychotherapy: Continue as before    Labs reviewed     OARRS  I have personally reviewed the OARRS report for Jose Alejandro Hernandez. I have considered the risks of abuse, dependence, addiction and diversion      -Follow-up with this provider in 4 weeks.    - Follow up with physical health providers as  scheduled  -Risks/benefits/assessment of medication interventions discussed with pt; pt agreeable to plan. Will continue to monitor for symptoms mgmt and SEs and adjust plan as needed.  -MI to increase coping skills/behavior regulation.  -Safety plan reviewed.  -Call  Psychiatry at [phone] with issues.  -For [facility], [place] is a 24/7 hotline you can call for assistance at [phone]. Please call 911 or go to your closest Emergency Room if you feel worse. This includes thoughts of hurting yourself or anyone else, or having other troubles such as hearing voices, seeing visions, or having new and scary thoughts about the people around you.

## 2024-12-03 ENCOUNTER — TELEMEDICINE (OUTPATIENT)
Dept: PRIMARY CARE | Facility: CLINIC | Age: 44
End: 2024-12-03
Payer: COMMERCIAL

## 2024-12-03 VITALS — SYSTOLIC BLOOD PRESSURE: 117 MMHG | DIASTOLIC BLOOD PRESSURE: 78 MMHG | HEART RATE: 68 BPM

## 2024-12-03 DIAGNOSIS — Z13.220 LIPID SCREENING: ICD-10-CM

## 2024-12-03 DIAGNOSIS — Z12.11 COLON CANCER SCREENING: ICD-10-CM

## 2024-12-03 DIAGNOSIS — J30.9 ALLERGIC RHINITIS, UNSPECIFIED SEASONALITY, UNSPECIFIED TRIGGER: Primary | ICD-10-CM

## 2024-12-03 DIAGNOSIS — Z13.89 SCREENING FOR MULTIPLE CONDITIONS: ICD-10-CM

## 2024-12-03 DIAGNOSIS — N52.2 DRUG-INDUCED ERECTILE DYSFUNCTION: ICD-10-CM

## 2024-12-03 PROCEDURE — 3078F DIAST BP <80 MM HG: CPT | Performed by: NURSE PRACTITIONER

## 2024-12-03 PROCEDURE — 1036F TOBACCO NON-USER: CPT | Performed by: NURSE PRACTITIONER

## 2024-12-03 PROCEDURE — 99213 OFFICE O/P EST LOW 20 MIN: CPT | Performed by: NURSE PRACTITIONER

## 2024-12-03 PROCEDURE — 3074F SYST BP LT 130 MM HG: CPT | Performed by: NURSE PRACTITIONER

## 2024-12-03 RX ORDER — TADALAFIL 20 MG/1
20 TABLET ORAL DAILY PRN
Qty: 30 TABLET | Refills: 0 | Status: SHIPPED | OUTPATIENT
Start: 2024-12-03

## 2024-12-03 RX ORDER — FLUTICASONE PROPIONATE 50 MCG
1 SPRAY, SUSPENSION (ML) NASAL DAILY
Qty: 48 G | Refills: 3 | Status: SHIPPED | OUTPATIENT
Start: 2024-12-03

## 2024-12-03 NOTE — ASSESSMENT & PLAN NOTE
Requests 30 day refill to local pharm  No contraindications to use   No nitrates Rx   Orders:    tadalafil (Cialis) 20 mg tablet; Take 1 tablet (20 mg) by mouth once daily as needed for erectile dysfunction.

## 2024-12-03 NOTE — ASSESSMENT & PLAN NOTE
Stable with use of Flonase   Orders:    fluticasone (Flonase) 50 mcg/actuation nasal spray; Administer 1 spray into each nostril once daily. Shake gently. Before first use, prime pump. After use, clean tip and replace cap.

## 2024-12-03 NOTE — PROGRESS NOTES
"Subjective   Chief Complaint   Patient presents with    Follow-up       Patient ID: Jose Alejandro Hernandez is a 44 y.o. male who presents virtually today for Follow-up.    HPI  Jose Alejandro is a 45 yo est male presenting virtually today for routine f/u on GERD, ED and Allergic Rhinitis   LOV June 2023    I performed this visit using real-time telehealth tools, including an audio/video connection between Jose Alejandro Hernandez  and myself, Radha John CNP,  within the [facility].  Consent has been obtained for this visit.  I have verbally confirmed with Jose Alejandro Hernandez (or parent if under 18) that they are physically located in the [facility] during this virtual visit.  Telemedicine appropriate evaluation completed.  Unable to perform complete physical exam due to virtual visit.    Dx: ED, GERD, ANXIETY, ASTHMA, THC USE    Pt had previously been on medication for HTN  Stopped in June 2023 for BP \"113/73\"  Pt reports \" My blood pressure has been 115-120/70-80\"  Girlfriend is a nurse and tells him \"that is good\"   Not on medication     Pt is followed by Behavioral Health, C. Nkenglefac for anxiety  Rx Wellbutrin and Zoloft, was just Rx Buspar, has not started taking it   Mood: no concerns   Sleep: no concerns     Asthma is controlled with Albuterol as needed    GERD is controlled with Protonix daily     Pt is due for FBW and CRC screening     Allergies   Allergen Reactions    Shellfish Derived Angioedema       Review of Systems  ROS was completed and all systems are negative with the exception of what was noted in the the HPI.       Objective   /78   Pulse 68      Current Outpatient Medications   Medication Instructions    albuterol 90 mcg/actuation inhaler 2 puffs, inhalation, Every 6 hours PRN    buPROPion XL (WELLBUTRIN XL) 300 mg, oral, Every morning, Do not crush, chew, or split.    busPIRone (BUSPAR) 10 mg, oral, 2 times daily PRN    fluticasone (Flonase) 50 mcg/actuation nasal spray 1 spray, Each Nostril, Daily, " Shake gently. Before first use, prime pump. After use, clean tip and replace cap.    pantoprazole (PROTONIX) 40 mg, oral, Daily before breakfast    propranolol (INDERAL) 20 mg, oral, 3 times daily PRN    sertraline (ZOLOFT) 150 mg, oral, Daily    tadalafil (CIALIS) 20 mg, oral, Daily PRN         Physical Exam  Vitals reviewed.   Pulmonary:      Effort: Pulmonary effort is normal.   Neurological:      Mental Status: He is alert and oriented to person, place, and time.   Psychiatric:         Mood and Affect: Mood normal.         Thought Content: Thought content normal.         Judgment: Judgment normal.         Assessment & Plan  Allergic rhinitis, unspecified seasonality, unspecified trigger  Stable with use of Flonase   Orders:    fluticasone (Flonase) 50 mcg/actuation nasal spray; Administer 1 spray into each nostril once daily. Shake gently. Before first use, prime pump. After use, clean tip and replace cap.    Drug-induced erectile dysfunction  Requests 30 day refill to local pharm  No contraindications to use   No nitrates Rx   Orders:    tadalafil (Cialis) 20 mg tablet; Take 1 tablet (20 mg) by mouth once daily as needed for erectile dysfunction.    Colon cancer screening    Orders:    Cologuard® colon cancer screening; Future    Cologuard® colon cancer screening    Screening for multiple conditions    Orders:    Comprehensive Metabolic Panel; Future    Lipid screening    Orders:    Lipid Panel; Future

## 2024-12-05 ENCOUNTER — APPOINTMENT (OUTPATIENT)
Dept: PRIMARY CARE | Facility: CLINIC | Age: 44
End: 2024-12-05
Payer: COMMERCIAL

## 2024-12-16 ENCOUNTER — APPOINTMENT (OUTPATIENT)
Dept: BEHAVIORAL HEALTH | Facility: CLINIC | Age: 44
End: 2024-12-16
Payer: COMMERCIAL

## 2024-12-20 ENCOUNTER — PATIENT MESSAGE (OUTPATIENT)
Dept: BEHAVIORAL HEALTH | Facility: CLINIC | Age: 44
End: 2024-12-20
Payer: COMMERCIAL

## 2025-01-02 ENCOUNTER — APPOINTMENT (OUTPATIENT)
Dept: BEHAVIORAL HEALTH | Facility: CLINIC | Age: 45
End: 2025-01-02
Payer: COMMERCIAL

## 2025-01-02 DIAGNOSIS — F33.1 MODERATE EPISODE OF RECURRENT MAJOR DEPRESSIVE DISORDER: ICD-10-CM

## 2025-01-02 DIAGNOSIS — G47.00 INSOMNIA, UNSPECIFIED TYPE: ICD-10-CM

## 2025-01-02 DIAGNOSIS — F41.1 GAD (GENERALIZED ANXIETY DISORDER): ICD-10-CM

## 2025-01-02 PROCEDURE — 99214 OFFICE O/P EST MOD 30 MIN: CPT

## 2025-01-02 RX ORDER — BUPROPION HYDROCHLORIDE 300 MG/1
300 TABLET ORAL EVERY MORNING
Qty: 90 TABLET | Refills: 3 | Status: SHIPPED | OUTPATIENT
Start: 2025-01-02 | End: 2026-01-02

## 2025-01-02 NOTE — LETTER
January 2, 2025     Patient: Jose Alejandro Hernandez   YOB: 1980   Date of Visit: 12/31/2024       To Whom It May Concern:    It is my medical opinion that Jose Alejandro Hernandez  - PLEASE SEE SIGNED FMLA CERTIFICATION    If you have any questions or concerns, please don't hesitate to call.     Sincerely,        Marques Jewell, GLYNN-CNP    Psychiatric Mental Health Nurse Practitioner-[facility]  Department of Psychiatry  [address] [place] [address]  Phone: [phone]  Fax: [phone]

## 2025-01-02 NOTE — PROGRESS NOTES
An interactive audio and video telecommunication system which permits real time communications between the patient (at the originating site) and provider (at the distant site) was utilized to provide this telehealth service.   Verbal consent was requested and obtained from Jose Alejandro Hernandez on this date, 25 for a telehealth visit. . Visit Locations: (Jose Alejandro Hernandez, home), provider (Mar[facility] Sierra, virtual office)    HPI  Jose Alejandro Hernandez is a 44 y.o. male patient with a CC Anxiety, Depression, Sleeping Problem, and Panic Attack presenting to outpatient treatment for a scheduled psych outpatient psychiatric follow-up.   Pt identify self by name, , and address     2025  States anxiety and depression is getting better. States employer still hasn't approve/process his FMLA paperwork and he is unsure why. States he took some time away from family and friends to ease mind to prevent constant questioning about his family status.,  So he essentially removed himself from the situation.  Divorce proceedings are currently scheduled to complete on 2025 unless new hurdles develop.  Otherwise, anxiety and depression feels mostly improved since the increased dose of Zoloft.  Continue to tolerate Wellbutrin well too.  Denies panic attacks.  Denies substance use.  Denies SI/hallucinations/delusions/nazia/hypomania.  Denies any noticeable side effects from medication.  Overall, reports feeling mostly improved except for ongoing stress with divorce proceedings and pending approval of FMLA.    Current S/Sx:  -Mood: stable  -Depressive mood: moderate. PHQ-9 (16)  -Fatigue/Energy: average  -Feeling hope/help/worthless: denies  -Sleep: sleeps about 4 hours/night with severe anxiety, waking up a few times throughout the night.   -Motivation: low  -Appetite/Weight Changes: fluctuating appetite, gained about 15 lbs in the last few months  -Psychosis: denies hallucinations/delusions  -SI/HI: Denies current suicidal  intent/plan. CSSRS negative  -Guns/Weapons at home: denies     -Worry excessively: severe anxiety noted, impacting ability to complete ADLs, function at work, process day to day stressor, handle up coming divorce, affecting sleep.  -Difficulty controlling worry: yes  -Easily fatigued d/t worry: yes  -Poor concentration d/t worry: yes  -Sleep disturbance d/t worry: yes  -Easy irritability d/t worry: denies  -Restless / feeling on edge d/t worry: yes  -SEYMOUR-7 (17)     Panic attacks  Onset: 2020, duration: sometimes an hour, sometimes longer, frequency: very frequent, associated s/sx: SOB, confusion, racing mind, racing thoughts, fear of impending doom, feels trapped, relieving factors: time/sometimes Propranolol, precipitating factors: current stressors, last one: yesterday     HISTORY  PSYCH HISTORY  -Psych Hx: SEYMOUR, MDD, sleep problems, and hx of TD (cannabis, hallucinogens) in remission since 4 yrs ago  -Psych Hospitalization Hx: denies  -Suicide Attempt Hx: denies  -Self-Harm/Violence Hx: denies  -Current psych meds: Wellbutrin  mg daily and Zoloft 100 mg daily, Propranolol 10 mg daily as needed for severe anxiety/panic attacks (mostly takes 5 mg d/t sedating effects)  -Psych Med Hx: none     SUBSTANCE USE HISTORY  -Substance Use Hx: cannabis (25 yrs), hallucinogens (5 yrs)  -ETOH: denies  -Tobacco: denies  -Caffeine: some coffee use  -Substance Abuse Treatment Hx: Treatment program to help quit. Armin Casper, 2020, has a sponsor, NA meetings 2-3 times/wk, not in the last 3 months d/t work wks.     FAMILY HISTORY  -Family Psych Hx: maternal GM: schizophrenia  -Family Suicide Hx: denies  -Family Substance Abuse Hx: both GF: alcoholics     SOCIAL HISTORY  -Upbringing: Grew up with both parents. Has 1 sister  -income: prominent financial struggles  -safety: feels safe at home/generally  -Support system: good  -Trauma: denies  -Education: masters  -Work: education  -Marital Status: undergoing divorce  proceedings since 1 month ago  -Children: 4  -Living situation: living alone  -: denies  -Legal: denies      MEDICAL HISTORY  -PCP: GLYNN Jenkins-CNP  -TBI/head trauma/LOC/seizure hx: denies     REVIEW OF SYSTEMS  Review of Systems   All other systems reviewed and are negative.       PHYSICAL EXAM  Physical Exam  Psychiatric:         Attention and Perception: Attention and perception normal.         Mood and Affect: Mood is anxious and depressed. Affect is flat.         Speech: Speech normal.         Behavior: Behavior normal. Behavior is cooperative.         Thought Content: Thought content normal.         Cognition and Memory: Cognition and memory normal.         Judgment: Judgment normal.          IMPRESSION  Jose Alejandro Hernandez is a 44 y.o. male patient with a CC Anxiety, Depression, Sleeping Problem, and Panic Attack presenting to outpatient treatment for a scheduled psych outpatient psychiatric follow-up.    Jose Alejandro reports mostly improved anxiety and depressive symptoms with no panic attacks since last visit.  Patient took a little vacation to remove himself from the surroundings of family and friends who frequently question his relationship with family.  He also continue to ponder why FMLA has not been approved yet.  Otherwise, reports significant improvement with symptoms since the increased dose of sertraline.  Otherwise, denies mood swings.  No hallucinations/delusion/nazia/hypomania/SI reported. Appetite and sleep are unstable.  No side effects or substance use concerns at this time including cannabis and hallucinations. Discussed to continue current regimen and follow-up in 6 weeks.  Last urine for divorce proceedings is currently scheduled for 1/13/2025 unless other hurdles ensue.       SI/HI ASSESSMENT  -Risk Assessment: Jose Alejandro Hernandez is currently a medium chronic risk of suicide and self-harm despite no past suicide attempt(s) and not currently endorsing thoughts of suicide.   -Suicidal  Risk Factors: , male, and substance abuse  -Protective Factors: strong coping skills, sense of responsibility towards family, positive family relationships, hopefulness/future orientation, marriage/partnership, and employment  -Plan to Reduce Risk: increase coping skills .     PLAN  Reviewed diagnostic impression including subjective and objective data and provided education about Panic attacks, MDD, SEYMOUR, and Sleep disturbance, etiology, treatment recommendations including medication, therapy, course of treatment and prognosis. Patient amenable to treatment plan.      Dx: Anxiety, Depression, Sleeping Problem, and Panic Attack  CONTINUE buspirone 10 mg 2 times daily as needed for severe anxiety/panic attacks  CONTINUE Wellbutrin  mg daily  CONTINUE Zoloft 150 mg daily    TO DO  - FMLA - completed     Reviewed r/b/a, possible side effects of the medication. Client is aware about the benefit outweighs the risk.     Psychotherapy: Continue as before    Labs reviewed     OARRS  I have personally reviewed the OARRS report for Jose Alejandro POE Jigneshanup. I have considered the risks of abuse, dependence, addiction and diversion      -Follow-up with this provider in 6 weeks.    - Follow up with physical health providers as scheduled  -Risks/benefits/assessment of medication interventions discussed with pt; pt agreeable to plan. Will continue to monitor for symptoms mgmt and SEs and adjust plan as needed.  -MI to increase coping skills/behavior regulation.  -Safety plan reviewed.  -Call  Psychiatry at [phone] with issues.  -For [facility] residents, [employer] is a 24/7 hotline you can call for assistance at [phone]) 253-9403. Please call 911 or go to your closest Emergency Room if you feel worse. This includes thoughts of hurting yourself or anyone else, or having other troubles such as hearing voices, seeing visions, or having new and scary thoughts about the people around you.

## 2025-01-07 ENCOUNTER — TELEPHONE (OUTPATIENT)
Dept: OTHER | Age: 45
End: 2025-01-07

## 2025-01-07 ENCOUNTER — TELEPHONE (OUTPATIENT)
Dept: OTHER | Age: 45
End: 2025-01-07
Payer: COMMERCIAL

## 2025-01-07 NOTE — TELEPHONE ENCOUNTER
Human Resource rep from [facility] [employer] is reaching out with [facility] paperwork that is extremly time sensitive. Christine Mcintyre submitted a revised email on 1/6/25 at 3:29p. She needs that form filled out and sent back today. Pages 2 and 4 need your focus.  She needs for those pages to be read over carefully, signed and dated that you imputed the information.  She would like confirmation that you have received this request at [phone] or via email.

## 2025-02-04 DIAGNOSIS — F33.1 MODERATE EPISODE OF RECURRENT MAJOR DEPRESSIVE DISORDER: ICD-10-CM

## 2025-02-04 DIAGNOSIS — F41.1 GAD (GENERALIZED ANXIETY DISORDER): ICD-10-CM

## 2025-02-04 RX ORDER — SERTRALINE HYDROCHLORIDE 100 MG/1
100 TABLET, FILM COATED ORAL DAILY
Qty: 90 TABLET | Refills: 1 | Status: SHIPPED | OUTPATIENT
Start: 2025-02-04

## 2025-02-13 ENCOUNTER — APPOINTMENT (OUTPATIENT)
Dept: BEHAVIORAL HEALTH | Facility: CLINIC | Age: 45
End: 2025-02-13
Payer: COMMERCIAL

## 2025-02-13 DIAGNOSIS — F41.1 GAD (GENERALIZED ANXIETY DISORDER): ICD-10-CM

## 2025-02-13 DIAGNOSIS — G47.00 INSOMNIA, UNSPECIFIED TYPE: ICD-10-CM

## 2025-02-13 DIAGNOSIS — F33.1 MODERATE EPISODE OF RECURRENT MAJOR DEPRESSIVE DISORDER: ICD-10-CM

## 2025-02-13 PROCEDURE — 99214 OFFICE O/P EST MOD 30 MIN: CPT

## 2025-02-13 RX ORDER — HYDROXYZINE HYDROCHLORIDE 25 MG/1
12.5-25 TABLET, FILM COATED ORAL 3 TIMES DAILY PRN
Qty: 90 TABLET | Refills: 1 | Status: SHIPPED | OUTPATIENT
Start: 2025-02-13 | End: 2025-04-14

## 2025-02-13 RX ORDER — SERTRALINE HYDROCHLORIDE 100 MG/1
150 TABLET, FILM COATED ORAL DAILY
Qty: 90 TABLET | Refills: 1 | Status: SHIPPED | OUTPATIENT
Start: 2025-02-13

## 2025-02-13 ASSESSMENT — ANXIETY QUESTIONNAIRES
2. NOT BEING ABLE TO STOP OR CONTROL WORRYING: SEVERAL DAYS
7. FEELING AFRAID AS IF SOMETHING AWFUL MIGHT HAPPEN: NOT AT ALL
4. TROUBLE RELAXING: SEVERAL DAYS
5. BEING SO RESTLESS THAT IT IS HARD TO SIT STILL: NOT AT ALL
6. BECOMING EASILY ANNOYED OR IRRITABLE: NOT AT ALL
1. FEELING NERVOUS, ANXIOUS, OR ON EDGE: MORE THAN HALF THE DAYS
3. WORRYING TOO MUCH ABOUT DIFFERENT THINGS: SEVERAL DAYS
GAD7 TOTAL SCORE: 5
IF YOU CHECKED OFF ANY PROBLEMS ON THIS QUESTIONNAIRE, HOW DIFFICULT HAVE THESE PROBLEMS MADE IT FOR YOU TO DO YOUR WORK, TAKE CARE OF THINGS AT HOME, OR GET ALONG WITH OTHER PEOPLE: SOMEWHAT DIFFICULT

## 2025-02-13 ASSESSMENT — PATIENT HEALTH QUESTIONNAIRE - PHQ9
8. MOVING OR SPEAKING SO SLOWLY THAT OTHER PEOPLE COULD HAVE NOTICED. OR THE OPPOSITE, BEING SO FIGETY OR RESTLESS THAT YOU HAVE BEEN MOVING AROUND A LOT MORE THAN USUAL: NOT AT ALL
7. TROUBLE CONCENTRATING ON THINGS, SUCH AS READING THE NEWSPAPER OR WATCHING TELEVISION: SEVERAL DAYS
2. FEELING DOWN, DEPRESSED OR HOPELESS: SEVERAL DAYS
3. TROUBLE FALLING OR STAYING ASLEEP: SEVERAL DAYS
10. IF YOU CHECKED OFF ANY PROBLEMS, HOW DIFFICULT HAVE THESE PROBLEMS MADE IT FOR YOU TO DO YOUR WORK, TAKE CARE OF THINGS AT HOME, OR GET ALONG WITH OTHER PEOPLE: SOMEWHAT DIFFICULT
4. FEELING TIRED OR HAVING LITTLE ENERGY: SEVERAL DAYS
5. POOR APPETITE OR OVEREATING: SEVERAL DAYS
SUM OF ALL RESPONSES TO PHQ QUESTIONS 1-9: 7
6. FEELING BAD ABOUT YOURSELF - OR THAT YOU ARE A FAILURE OR HAVE LET YOURSELF OR YOUR FAMILY DOWN: SEVERAL DAYS
1. LITTLE INTEREST OR PLEASURE IN DOING THINGS: SEVERAL DAYS
9. THOUGHTS THAT YOU WOULD BE BETTER OFF DEAD, OR OF HURTING YOURSELF: NOT AT ALL
SUM OF ALL RESPONSES TO PHQ9 QUESTIONS 1 & 2: 2

## 2025-02-13 NOTE — PROGRESS NOTES
"Jose Alejandro Hernandez is a 44 y.o. male patient presenting for a virtual FUV  Visit location: patient (Jose Alejandro, home), provider (SHAHID Car, virtual office)   Pt identify self by name, , and address     Chief Complaint   Patient presents with    Anxiety    Depression    Sleeping Problem    Follow-up    Med Management     HPI    2025  Reports similar set of symptoms since last visit with anxiety, depression not getting better nor worse on current regimen.  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  Sleep has mostly been okay, reports a singular incident about a week ago with dressing and turning onto 3 AM the morning, was able to fall asleep.  Appetite fluctuates depending on the day.  Denies panic attacks.  Denies any noticeable side effects from the medication.  Denies substance use.  Denies SI/hallucinations/delusions/nazia/hypomania.  Denies mood swings.  Overall, reports feeling very minimal changes since last visit    Current S/Sx:  -Mood: stable  -Depressive mood: moderate. PHQ-9 (7)  -Fatigue/Energy: average  -Feeling hope/help/worthless: denies  -Sleep: sleeps better since last visit  -Motivation: Improving  -Appetite/Weight Changes: fluctuating appetite  -Psychosis: denies hallucinations/delusions  -SI/HI: Denies current suicidal intent/plan. CSSRS negative  -Guns/Weapons at home: denies     -Worry excessively: Intermittent bouts of anxiety noted  -SEYMOUR-7 (5)     Panic attacks  Denies recent panic attacks     HISTORY  PSYCH HISTORY  -Psych Hx: SEYMOUR, MDD, sleep problems, and hx of TD (cannabis, hallucinogens) in remission since 4 yrs ago  -Psych Hospitalization Hx: denies  -Suicide Attempt Hx: denies  -Self-Harm/Violence Hx: denies  -Current psych meds: Wellbutrin  mg daily and Zoloft 100 mg daily, " Propranolol 10 mg daily as needed for severe anxiety/panic attacks (mostly takes 5 mg d/t sedating effects)  -Psych Med Hx: none     SUBSTANCE USE HISTORY  -Substance Use Hx: cannabis (25 yrs), hallucinogens (5 yrs)  -ETOH: denies  -Tobacco: denies  -Caffeine: some coffee use  -Substance Abuse Treatment Hx: Treatment program to help quit. Armin Casper, 2020, has a sponsor, NA meetings 2-3 times/wk, not in the last 3 months d/t work wks.     FAMILY HISTORY  -Family Psych Hx: maternal GM: schizophrenia  -Family Suicide Hx: denies  -Family Substance Abuse Hx: both GF: alcoholics     SOCIAL HISTORY  -Upbringing: Grew up with both parents. Has 1 sister  -income: prominent financial struggles  -safety: feels safe at home/generally  -Support system: good  -Trauma: denies  -Education: masters  -Work: education  -Marital Status: undergoing divorce proceedings since 1 month ago  -Children: 4  -Living situation: living alone  -: denies  -Legal: denies      MEDICAL HISTORY  -PCP: GLYNN Jenkins-CNP  -TBI/head trauma/LOC/seizure hx: denies     REVIEW OF SYSTEMS  Review of Systems   All other systems reviewed and are negative.       PHYSICAL EXAM  Physical Exam  Psychiatric:         Attention and Perception: Attention and perception normal.         Mood and Affect: Mood and affect normal.         Speech: Speech normal.         Behavior: Behavior normal. Behavior is cooperative.         Thought Content: Thought content normal.         Cognition and Memory: Cognition and memory normal.         Judgment: Judgment normal.          IMPRESSION  Jose Alejandro Hernandez is a 44 y.o. male patient with a CC Anxiety, Depression, Sleeping Problem, Follow-up, and Med Management presenting to outpatient treatment for a scheduled psych outpatient psychiatric follow-up.    Jose Alejandro reports mostly no changes to anxiety and depression since last visit.  Notes his symptoms have not gotten better nor worse.  Stress is mainly precipitated  by ongoing divorce proceedings which does not seem to be resolving soon.  He is currently placed on administrative leave until he cases resolved.  Denies panic attacks or mood swings. No hallucinations/delusion/nazia/hypomania/SI reported. Appetite and sleep remain unstable, but slightly improved since last visit.  No side effects or substance use concerns at this time.  Discussed to add hydroxyzine with sleep and resolve anxiety/panic attack.  Discussed to continue current regimen and follow-up in 8 weeks.       SI/HI ASSESSMENT  -Risk Assessment: Jose Alejandro Hernandez is currently a medium chronic risk of suicide and self-harm despite no past suicide attempt(s) and not currently endorsing thoughts of suicide.   -Suicidal Risk Factors: , male, and substance abuse  -Protective Factors: strong coping skills, sense of responsibility towards family, positive family relationships, hopefulness/future orientation, marriage/partnership, and employment  -Plan to Reduce Risk: increase coping skills .     PLAN  Reviewed diagnostic impression including subjective and objective data and provided education about Panic attacks, MDD, SEYMOUR, and Sleep disturbance, etiology, treatment recommendations including medication, therapy, course of treatment and prognosis. Patient amenable to treatment plan.      Dx: Anxiety, Depression, Sleeping Problem, Follow-up, and Med Management  CONTINUE buspirone 10 mg 2 times daily as needed for severe anxiety/panic attacks  CONTINUE Wellbutrin  mg daily  CONTINUE Zoloft 150 mg daily  START Hydroxyzine 25 mg, take 0.5 - 1 tabs upto 3 times daily as needed for anxiety/panic attacks/sleep unresolved by Propranolol     Reviewed r/b/a, possible side effects of the medication. Client is aware about the benefit outweighs the risk.     Psychotherapy: Continue as before    Labs reviewed     OARRS  I have personally reviewed the OARRS report for Jose Alejandro Hernandez. I have considered the risks of abuse,  dependence, addiction and diversion      -Follow-up with this provider in 8 weeks.    -Total time: 27 minutes via virtual    - Follow up with physical health providers as scheduled  -Risks/benefits/assessment of medication interventions discussed with pt; pt agreeable to plan. Will continue to monitor for symptoms mgmt and SEs and adjust plan as needed.  -MI to increase coping skills/behavior regulation.  -Safety plan reviewed.  -Call  Psychiatry at [phone] with issues.  -For [facility], [employer] is a 24/7 hotline you can call for assistance at [phone] [phone]. Please call [phone] or go to your closest Emergency Room if you feel worse. This includes thoughts of hurting yourself or anyone else, or having other troubles such as hearing voices, seeing visions, or having new and scary thoughts about the people around you.

## 2025-03-19 LAB — NONINV COLON CA DNA+OCC BLD SCRN STL QL: NEGATIVE

## 2025-04-10 ENCOUNTER — APPOINTMENT (OUTPATIENT)
Dept: BEHAVIORAL HEALTH | Facility: CLINIC | Age: 45
End: 2025-04-10
Payer: COMMERCIAL

## 2025-05-02 ENCOUNTER — PATIENT MESSAGE (OUTPATIENT)
Dept: PRIMARY CARE | Facility: CLINIC | Age: 45
End: 2025-05-02
Payer: COMMERCIAL

## 2025-05-02 DIAGNOSIS — N52.2 DRUG-INDUCED ERECTILE DYSFUNCTION: ICD-10-CM

## 2025-05-02 RX ORDER — TADALAFIL 20 MG/1
20 TABLET ORAL DAILY PRN
Qty: 90 TABLET | Refills: 3 | Status: SHIPPED | OUTPATIENT
Start: 2025-05-02

## 2025-07-08 DIAGNOSIS — F33.1 MODERATE EPISODE OF RECURRENT MAJOR DEPRESSIVE DISORDER: ICD-10-CM

## 2025-07-08 RX ORDER — BUPROPION HYDROCHLORIDE 150 MG/1
150 TABLET ORAL EVERY MORNING
Qty: 15 TABLET | Refills: 0 | Status: SHIPPED | OUTPATIENT
Start: 2025-07-08 | End: 2025-07-23

## 2025-07-17 ENCOUNTER — APPOINTMENT (OUTPATIENT)
Dept: PRIMARY CARE | Facility: CLINIC | Age: 45
End: 2025-07-17
Payer: COMMERCIAL